# Patient Record
Sex: FEMALE | Race: OTHER | HISPANIC OR LATINO | URBAN - METROPOLITAN AREA
[De-identification: names, ages, dates, MRNs, and addresses within clinical notes are randomized per-mention and may not be internally consistent; named-entity substitution may affect disease eponyms.]

---
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
## 2018-11-07 NOTE — ED PROVIDER NOTE - GASTROINTESTINAL [+], MLM
Quality 402: Tobacco Use And Help With Quitting Among Adolescents: Patient screened for tobacco and never smoked Quality 431: Preventive Care And Screening: Unhealthy Alcohol Use - Screening: Patient screened for unhealthy alcohol use using a single question and scores less than 2 times per year Detail Level: Zone Quality 130: Documentation Of Current Medications In The Medical Record: Current Medications Documented VOMITING/NAUSEA/ABDOMINAL PAIN

## 2021-12-21 NOTE — ED ADULT NURSE NOTE - AS SC BRADEN MOBILITY
Unna Boot Text: An Unna boot was placed to help immobilize the limb and facilitate more rapid healing. (4) no limitation

## 2022-11-19 NOTE — ED ADULT NURSE NOTE - HARM RISK FACTORS
Problem: SELF HARM/SUICIDALITY  Goal: Will have no self-injury during hospital stay  Description: INTERVENTIONS:  - Q 15 MINUTES: Routine safety checks  - Q WAKING SHIFT & PRN: Assess risk to determine if routine checks are adequate to maintain patient safety  - Encourage patient to participate actively in care by formulating a plan to combat response to suicidal ideation, identify supports and resources  Outcome: Progressing     Problem: DEPRESSION  Goal: Will be euthymic at discharge  Description: INTERVENTIONS:  - Administer medication as ordered  - Provide emotional support via 1:1 interaction with staff  - Encourage involvement in milieu/groups/activities  - Monitor for social isolation  Outcome: Progressing     Problem: BEHAVIOR  Goal: Pt/Family maintain appropriate behavior and adhere to behavioral management agreement, if implemented  Description: INTERVENTIONS:  - Assess the family dynamic   - Encourage verbalization of thoughts and concerns in a socially appropriate manner  - Assess patient/family's coping skills and non-compliant behavior (including use of illegal substances)  - Utilize positive, consistent limit setting strategies supporting safety of patient, staff and others  - Initiate consult with Case Management, Spiritual Care or other ancillary services as appropriate  - If a patient's/visitor's behavior jeopardizes the safety of the patient, staff, or others, refer to organization procedure     - Notify Security of behavior or suspected illegal substances which indicate the need for search of the patient and/or belongings  - Encourage participation in the decision making process about a behavioral management agreement; implement if patient meets criteria  Outcome: Progressing     Problem: ANXIETY  Goal: Will report anxiety at manageable levels  Description: INTERVENTIONS:  - Administer medication as ordered  - Teach and encourage coping skills  - Provide emotional support  - Assess patient/family for anxiety and ability to cope  Outcome: Progressing  Goal: By discharge: Patient will verbalize 2 strategies to deal with anxiety  Description: Interventions:  - Identify any obvious source/trigger to anxiety  - Staff will assist patient in applying identified coping technique/skills  - Encourage attendance of scheduled groups and activities  Outcome: Progressing     Problem: SLEEP DISTURBANCE  Goal: Will exhibit normal sleeping pattern  Description: Interventions:  -  Assess the patients sleep pattern, noting recent changes  - Administer medication as ordered  - Decrease environmental stimuli, including noise, as appropriate during the night  - Encourage the patient to actively participate in unit groups and or exercise during the day to enhance ability to achieve adequate sleep at night  - Assess the patient, in the morning, encouraging a description of sleep experience  Outcome: Progressing     Problem: SELF CARE DEFICIT  Goal: Return ADL status to a safe level of function  Description: INTERVENTIONS:  - Administer medication as ordered  - Assess ADL deficits and provide assistive devices as needed  - Obtain PT/OT consults as needed  - Assist and instruct patient to increase activity and self care as tolerated  Outcome: Progressing     Problem: DISCHARGE PLANNING  Goal: Discharge to home or other facility with appropriate resources  Description: INTERVENTIONS:  - Identify barriers to discharge w/patient and caregiver  - Arrange for needed discharge resources and transportation as appropriate  - Identify discharge learning needs (meds, wound care, etc )  - Arrange for interpretive services to assist at discharge as needed  - Refer to Case Management Department for coordinating discharge planning if the patient needs post-hospital services based on physician/advanced practitioner order or complex needs related to functional status, cognitive ability, or social support system  Outcome: Progressing Problem: SAFETY ADULT  Goal: Patient will remain free of falls  Description: INTERVENTIONS:  - Educate patient/family on patient safety including physical limitations  - Instruct patient to call for assistance with activity   - Consult OT/PT to assist with strengthening/mobility   - Keep Call bell within reach  - Keep bed low and locked with side rails adjusted as appropriate  - Keep care items and personal belongings within reach  - Initiate and maintain comfort rounds  - Make Fall Risk Sign visible to staff  - Apply yellow socks and bracelet for high fall risk patients  - Consider moving patient to room near nurses station  Outcome: Progressing no

## 2023-03-17 NOTE — ED PROVIDER NOTE - CPE EDP ENMT NORM
Alyssia from [facility] left message, confused about the instructions for usage on the medication.  [phone]  
Reviewed visit notes;  Called and clarified that prescription is correct as entered.  
normal...

## 2023-10-25 NOTE — ED PROVIDER NOTE - CROS ED NEURO ALL NEG
Patient Education       Well Child Exam 11 to 14 Years   About this topic   Your child's well child exam is a visit with the doctor to check your child's health. The doctor measures your child's weight and height, and may measure your child's body mass index (BMI). The doctor plots these numbers on a growth curve. The growth curve gives a picture of your child's growth at each visit. The doctor may listen to your child's heart, lungs, and belly. Your doctor will do a full exam of your child from the head to the toes.  Your child may also need shots or blood tests during this visit.  General   Growth and Development   Your doctor will ask you how your child is developing. The doctor will focus on the skills that most children your child's age are expected to do. During this time of your child's life, here are some things you can expect.  Physical development - Your child may:  Show signs of maturing physically  Need reminders about drinking water when playing  Be a little clumsy while growing  Hearing, seeing, and talking - Your child may:  Be able to see the long-term effects of actions  Understand many viewpoints  Begin to question and challenge existing rules  Want to help set household rules  Feelings and behavior - Your child may:  Want to spend time alone or with friends rather than with family  Have an interest in dating and the opposite sex  Value the opinions of friends over parents' thoughts or ideas  Want to push the limits of what is allowed  Believe bad things wont happen to them  Feeding - Your child needs:  To learn to make healthy choices when eating. Serve healthy foods like lean meats, fruits, vegetables, and whole grains. Help your child choose healthy foods when out to eat.  To start each day with a healthy breakfast  To limit soda, chips, candy, and foods that are high in fats and sugar  Healthy snacks available like fruit, cheese and crackers, or peanut butter  To eat meals as a part of the  family. Turn the TV and cell phones off while eating. Talk about your day, rather than focusing on what your child is eating.  Sleep - Your child:  Needs more sleep  Is likely sleeping about 8 to 10 hours in a row at night  Should be allowed to read each night before bed. Have your child brush and floss the teeth before going to bed as well.  Should limit TV and computers for the hour before bedtime  Keep cell phones, tablets, televisions, and other electronic devices out of bedrooms overnight. They interfere with sleep.  Needs a routine to make week nights easier. Encourage your child to get up at a normal time on weekends instead of sleeping late.  Shots or vaccines - It is important for your child to get shots on time. This protects your child from very serious illnesses like pneumonia, blood and brain infections, tetanus, flu, or cancer. Your child may need:  HPV or human papillomavirus vaccine  Tdap or tetanus, diphtheria, and pertussis vaccine  Meningococcal vaccine  Influenza vaccine  Help for Parents   Activities.  Encourage your child to spend at least 1 hour each day being physically active.  Offer your child a variety of activities to take part in. Include music, sports, arts and crafts, and other things your child is interested in. Take care not to over schedule your child. One to 2 activities a week outside of school is often a good number for your child.  Make sure your child wears a helmet when using anything with wheels like skates, skateboard, bike, etc.  Encourage time spent with friends. Provide a safe area for this.  Here are some things you can do to help keep your child safe and healthy.  Talk to your child about the dangers of smoking, drinking alcohol, and using drugs. Do not allow anyone to smoke in your home or around your child.  Make sure your child uses a seat belt when riding in the car. Your child should ride in the back seat until 13 years of age.  Talk with your child about peer  pressure. Help your child learn how to handle risky things friends may want to do.  Remind your child to use headphones responsibly. Limit how loud the volume is turned up. Never wear headphones, text, or use a cell phone while riding a bike or crossing the street.  Protect your child from gun injuries. If you have a gun, use a trigger lock. Keep the gun locked up and the bullets kept in a separate place.  Limit screen time for children to 1 to 2 hours per day. This includes TV, phones, computers, and video games.  Discuss social media safety  Parents need to think about:  Monitoring your child's computer use, especially when on the Internet  How to keep open lines of communication about unwanted touch, sex, and dating  How to continue to talk about puberty  Having your child help with some family chores to encourage responsibility within the family  Helping children make healthy choices  The next well child visit will most likely be in 1 year. At this visit, your doctor may:  Do a full check up on your child  Talk about school, friends, and social skills  Talk about sexuality and sexually-transmitted diseases  Talk about driving and safety  When do I need to call the doctor?   Fever of 100.4°F (38°C) or higher  Your child has not started puberty by age 14  Low mood, suddenly getting poor grades, or missing school  You are worried about your child's development  Where can I learn more?   Centers for Disease Control and Prevention  https://www.cdc.gov/ncbddd/childdevelopment/positiveparenting/adolescence.html   Centers for Disease Control and Prevention  https://www.cdc.gov/vaccines/parents/diseases/teen/index.html   KidsHealth  http://kidshealth.org/parent/growth/medical/checkup_11yrs.html#bhc675   KidsHealth  http://kidshealth.org/parent/growth/medical/checkup_12yrs.html#jmy330   KidsHealth  http://kidshealth.org/parent/growth/medical/checkup_13yrs.html#cqx294    KidsHealth  http://kidshealth.org/parent/growth/medical/checkup_14yrs.html#   Last Reviewed Date   2019-10-14  Consumer Information Use and Disclaimer   This information is not specific medical advice and does not replace information you receive from your health care provider. This is only a brief summary of general information. It does NOT include all information about conditions, illnesses, injuries, tests, procedures, treatments, therapies, discharge instructions or life-style choices that may apply to you. You must talk with your health care provider for complete information about your health and treatment options. This information should not be used to decide whether or not to accept your health care providers advice, instructions or recommendations. Only your health care provider has the knowledge and training to provide advice that is right for you.  Copyright   Copyright © 2021 [employer] and its affiliates and/or licensors. All rights reserved.    At 9 years old, children who have outgrown the booster seat may use the adult safety belt fastened correctly.   If you have an active [facility] account, please look for your well child questionnaire to come to your [facility] account before your next well child visit.   negative...